# Patient Record
Sex: MALE | Race: WHITE
[De-identification: names, ages, dates, MRNs, and addresses within clinical notes are randomized per-mention and may not be internally consistent; named-entity substitution may affect disease eponyms.]

---

## 2021-04-05 ENCOUNTER — HOSPITAL ENCOUNTER (OUTPATIENT)
Dept: HOSPITAL 43 - DL.ENDO | Age: 66
Discharge: HOME | End: 2021-04-05
Attending: INTERNAL MEDICINE
Payer: COMMERCIAL

## 2021-04-05 DIAGNOSIS — Z12.11: Primary | ICD-10-CM

## 2021-04-05 DIAGNOSIS — K62.89: ICD-10-CM

## 2021-04-05 DIAGNOSIS — F17.210: ICD-10-CM

## 2021-04-05 DIAGNOSIS — Z98.890: ICD-10-CM

## 2021-04-05 PROCEDURE — 45380 COLONOSCOPY AND BIOPSY: CPT

## 2021-04-05 NOTE — OR
DATE:  04/05/2021

 

PROCEDURES:  Total colonoscopy, narrow-band imaging, and multiple pinch

biopsies.

 

INSTRUMENT USED:  PCF-H190DL Olympus video colonoscope.

 

PREMEDICATIONS:  Fentanyl 150 mcg intravenous, Versed 4 mg intravenous, nasal O2

cannula.

 

The procedure was done under pulse oximetry, BP recording, and cardiac monitor.

 

INDICATION:  Screening colonoscopic examination is done for detection of any

polypoid lesions and removal, endoscopic hemostasis therapy if needed.

 

DESCRIPTION OF PROCEDURE:  Initial rectal exam showed BPH.  Rigid anoscopy was

unremarkable.  Colonoscope was passed with ease.  In mid rectum, just less than

1 cm sized benign-appearing submucosal lesion was noted, pillow sign positive

using biopsy forceps.  NBI views were obtained, photographs were obtained,

multiple pinch biopsies were obtained and sent for histopathology.  The scope

was passed with ease up to the ileocecal area.  Photographs were taken of the

normal-appearing cecum, identified by landmarks of appendiceal orifice and

double-bulged ileocecal folds.  No bleeding was noted from any of the visualized

areas at the commencement of the examination.  The bowel preparation was found

to be adequate, Lawtey scale 2 in all the regions, total score 6.  No stricture.

No vascular ectasia.  No large isolated ulcerations seen.  No evidence of

diffuse inflammatory bowel disease in the form of friability, contact bleeding,

or ulcerations.  Probing the proximal sides of folds and flexures using adequate

distention and clearing up the stool material, withdrawal of the scope was made,

cecum to rectum time was 6 minutes.  No bleeding was noted from any of the

visualized areas at the completion of examination.

 

IMPRESSION:  Rectal submucosal lesion.

 

The patient tolerated the procedure well.

 

DD:  04/05/2021 08:08:14

DT:  04/05/2021 09:49:53

[facility]

Job #:  [id number]

## 2021-12-07 ENCOUNTER — HOSPITAL ENCOUNTER (OUTPATIENT)
Dept: HOSPITAL 43 - DL.ENDO | Age: 66
Discharge: HOME | End: 2021-12-07
Attending: INTERNAL MEDICINE
Payer: COMMERCIAL

## 2021-12-07 DIAGNOSIS — Z01.812: ICD-10-CM

## 2021-12-07 DIAGNOSIS — N40.0: ICD-10-CM

## 2021-12-07 DIAGNOSIS — Z98.890: ICD-10-CM

## 2021-12-07 DIAGNOSIS — F17.200: ICD-10-CM

## 2021-12-07 DIAGNOSIS — K22.89: Primary | ICD-10-CM

## 2021-12-07 DIAGNOSIS — Z20.822: ICD-10-CM

## 2021-12-07 PROCEDURE — 87635 SARS-COV-2 COVID-19 AMP PRB: CPT

## 2021-12-07 PROCEDURE — 43235 EGD DIAGNOSTIC BRUSH WASH: CPT

## 2021-12-07 PROCEDURE — U0002 COVID-19 LAB TEST NON-CDC: HCPCS

## 2021-12-07 NOTE — OR
DATE:  12/07/2021

 

PROCEDURE:  Esophagogastroduodenoscopy.

 

INSTRUMENT USED:  GIF- Olympus video panendoscope.

 

PREMEDICATIONS:  No oral or topical anesthesia used.  Fentanyl 100 mcg

intravenous, Versed 2 mg intravenous.

 

The procedure was done under pulse oximetry, BP recording, and cardiac monitor.

 

INDICATION:  The patient with recent imaging CT study showing thickened

esophageal wall and suspicious for malignancy.  Esophagogastroduodenoscopy is

performed for detection of any active erosive lesions, Kurtz esophagus and/or

malignancy also under consideration, endoscopic hemostasis therapy if needed.

 

PROCEDURE IN DETAIL:  The scope was passed with ease.  Adequate visualization of

the esophagus was made from proximal to distal areas.  No upper esophageal

lesions identified.  No distal esophageal stricture.  No uphill or downhill

esophageal varices.  No Domenica-Bosch tear.  No evidence of erosive esophagitis

by Los-Joan criteria.  No esophageal polyp or tumor mass identified.  Z-line

was seen at around 40 cm distal to the oral verge.  No proximal gastric varices

noted.  Gastric fundus examination by retroflexion showed no polypoid lesions.

No gastric ulcer, malignant mass, or vascular ectasia identified.  Duodenal bulb

showed no ulcer.  Visualized second part of the duodenum was unremarkable.  No

bleeding was noted from any of the visualized areas at the completion of

examination.  Photographs were taken of the duodenal bulb, gastric antrum,

fundus, and distal esophagus.

 

IMPRESSION:  Normal study.

 

The patient tolerated the procedure well.

 

DD:  12/07/2021 07:51:19

DT:  12/07/2021 08:09:36

[facility]

Job #:  [id number]